# Patient Record
Sex: FEMALE | Race: BLACK OR AFRICAN AMERICAN | Employment: FULL TIME | URBAN - METROPOLITAN AREA
[De-identification: names, ages, dates, MRNs, and addresses within clinical notes are randomized per-mention and may not be internally consistent; named-entity substitution may affect disease eponyms.]

---

## 2020-06-14 ENCOUNTER — HOSPITAL ENCOUNTER (EMERGENCY)
Age: 30
Discharge: HOME | End: 2020-06-14
Attending: EMERGENCY MEDICINE
Payer: COMMERCIAL

## 2020-06-14 ENCOUNTER — APPOINTMENT (OUTPATIENT)
Dept: ULTRASOUND IMAGING | Age: 30
End: 2020-06-14
Attending: EMERGENCY MEDICINE
Payer: COMMERCIAL

## 2020-06-14 VITALS
HEART RATE: 76 BPM | SYSTOLIC BLOOD PRESSURE: 109 MMHG | HEIGHT: 62 IN | DIASTOLIC BLOOD PRESSURE: 76 MMHG | BODY MASS INDEX: 32.76 KG/M2 | RESPIRATION RATE: 16 BRPM | WEIGHT: 178 LBS | TEMPERATURE: 98.3 F | OXYGEN SATURATION: 100 %

## 2020-06-14 DIAGNOSIS — N30.00 ACUTE CYSTITIS WITHOUT HEMATURIA: ICD-10-CM

## 2020-06-14 DIAGNOSIS — Z3A.15 15 WEEKS GESTATION OF PREGNANCY: ICD-10-CM

## 2020-06-14 DIAGNOSIS — O42.919 PRETERM PREMATURE RUPTURE OF MEMBRANES (PPROM) WITH UNKNOWN ONSET OF LABOR: Primary | ICD-10-CM

## 2020-06-14 DIAGNOSIS — O03.9 MISCARRIAGE: ICD-10-CM

## 2020-06-14 LAB
ABO + RH BLD: NORMAL
ALBUMIN SERPL-MCNC: 3.1 G/DL (ref 3.4–5)
ALBUMIN/GLOB SERPL: 0.8 {RATIO} (ref 0.8–1.7)
ALP SERPL-CCNC: 45 U/L (ref 45–117)
ALT SERPL-CCNC: 18 U/L (ref 13–56)
ANION GAP SERPL CALC-SCNC: 5 MMOL/L (ref 3–18)
APPEARANCE UR: CLEAR
AST SERPL-CCNC: 11 U/L (ref 10–38)
BACTERIA URNS QL MICRO: ABNORMAL /HPF
BASOPHILS # BLD: 0 K/UL (ref 0–0.1)
BASOPHILS NFR BLD: 0 % (ref 0–2)
BILIRUB SERPL-MCNC: 0.3 MG/DL (ref 0.2–1)
BILIRUB UR QL: NEGATIVE
BUN SERPL-MCNC: 5 MG/DL (ref 7–18)
BUN/CREAT SERPL: 8 (ref 12–20)
CALCIUM SERPL-MCNC: 8.6 MG/DL (ref 8.5–10.1)
CHLORIDE SERPL-SCNC: 108 MMOL/L (ref 100–111)
CO2 SERPL-SCNC: 24 MMOL/L (ref 21–32)
COLOR UR: YELLOW
CREAT SERPL-MCNC: 0.59 MG/DL (ref 0.6–1.3)
DIFFERENTIAL METHOD BLD: ABNORMAL
EOSINOPHIL # BLD: 0.1 K/UL (ref 0–0.4)
EOSINOPHIL NFR BLD: 2 % (ref 0–5)
EPITH CASTS URNS QL MICRO: ABNORMAL /LPF (ref 0–5)
ERYTHROCYTE [DISTWIDTH] IN BLOOD BY AUTOMATED COUNT: 12.6 % (ref 11.6–14.5)
GLOBULIN SER CALC-MCNC: 4 G/DL (ref 2–4)
GLUCOSE SERPL-MCNC: 73 MG/DL (ref 74–99)
GLUCOSE UR STRIP.AUTO-MCNC: NEGATIVE MG/DL
HCG SERPL-ACNC: ABNORMAL MIU/ML (ref 0–10)
HCT VFR BLD AUTO: 37.2 % (ref 35–45)
HGB BLD-MCNC: 12.3 G/DL (ref 12–16)
HGB UR QL STRIP: ABNORMAL
KETONES UR QL STRIP.AUTO: NEGATIVE MG/DL
KOH PREP SPEC: NORMAL
LEUKOCYTE ESTERASE UR QL STRIP.AUTO: ABNORMAL
LIPASE SERPL-CCNC: 81 U/L (ref 73–393)
LYMPHOCYTES # BLD: 1.6 K/UL (ref 0.9–3.6)
LYMPHOCYTES NFR BLD: 21 % (ref 21–52)
MCH RBC QN AUTO: 29.9 PG (ref 24–34)
MCHC RBC AUTO-ENTMCNC: 33.1 G/DL (ref 31–37)
MCV RBC AUTO: 90.5 FL (ref 74–97)
MONOCYTES # BLD: 0.5 K/UL (ref 0.05–1.2)
MONOCYTES NFR BLD: 6 % (ref 3–10)
NEUTS SEG # BLD: 5.5 K/UL (ref 1.8–8)
NEUTS SEG NFR BLD: 71 % (ref 40–73)
NITRITE UR QL STRIP.AUTO: NEGATIVE
PH UR STRIP: 7.5 [PH] (ref 5–8)
PLATELET # BLD AUTO: 175 K/UL (ref 135–420)
PMV BLD AUTO: 10.8 FL (ref 9.2–11.8)
POTASSIUM SERPL-SCNC: 3.8 MMOL/L (ref 3.5–5.5)
PROT SERPL-MCNC: 7.1 G/DL (ref 6.4–8.2)
PROT UR STRIP-MCNC: NEGATIVE MG/DL
RBC # BLD AUTO: 4.11 M/UL (ref 4.2–5.3)
RBC #/AREA URNS HPF: ABNORMAL /HPF (ref 0–5)
SERVICE CMNT-IMP: NORMAL
SERVICE CMNT-IMP: NORMAL
SODIUM SERPL-SCNC: 137 MMOL/L (ref 136–145)
SP GR UR REFRACTOMETRY: 1.02 (ref 1–1.03)
UROBILINOGEN UR QL STRIP.AUTO: 1 EU/DL (ref 0.2–1)
WBC # BLD AUTO: 7.7 K/UL (ref 4.6–13.2)
WBC URNS QL MICRO: ABNORMAL /HPF (ref 0–5)
WET PREP GENITAL: NORMAL

## 2020-06-14 PROCEDURE — 84702 CHORIONIC GONADOTROPIN TEST: CPT

## 2020-06-14 PROCEDURE — 86900 BLOOD TYPING SEROLOGIC ABO: CPT

## 2020-06-14 PROCEDURE — 85025 COMPLETE CBC W/AUTO DIFF WBC: CPT

## 2020-06-14 PROCEDURE — 83690 ASSAY OF LIPASE: CPT

## 2020-06-14 PROCEDURE — 87210 SMEAR WET MOUNT SALINE/INK: CPT

## 2020-06-14 PROCEDURE — 87491 CHLMYD TRACH DNA AMP PROBE: CPT

## 2020-06-14 PROCEDURE — 80053 COMPREHEN METABOLIC PANEL: CPT

## 2020-06-14 PROCEDURE — 81001 URINALYSIS AUTO W/SCOPE: CPT

## 2020-06-14 PROCEDURE — 99285 EMERGENCY DEPT VISIT HI MDM: CPT

## 2020-06-14 PROCEDURE — 76805 OB US >/= 14 WKS SNGL FETUS: CPT

## 2020-06-14 RX ORDER — CEPHALEXIN 500 MG/1
500 CAPSULE ORAL 2 TIMES DAILY
Qty: 14 CAP | Refills: 0 | Status: SHIPPED | OUTPATIENT
Start: 2020-06-14 | End: 2020-06-21

## 2020-06-14 NOTE — ED PROVIDER NOTES
EMERGENCY DEPARTMENT HISTORY AND PHYSICAL EXAM    Date: 6/14/2020  Patient Name: Lexie Silverio    History of Presenting Illness     Chief Complaint   Patient presents with    Threatened Miscarriage         History Provided By: Patient     History Janeth Gomez):   10:22 AM  [place] Stefan Lopez is a 26 y.o. female [id number] (4 spontaneous AB, 1 elective) at 15 weeks with a complicated obstetrical PMHX including ectopic pregnancies who presents to the emergency department C/O loss of amniotic fluid onset last night. Associated sxs include loss of fluid, resolved cramping and slight vaginal bleeding. Pt denies fevers, chills or any other sxs or complaints. Patient states she had a rush of fluid last night over about a 15-minute timeframe with some blood tingeing discharge. 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 Chief Complaint: loss of amniotic fluid  Duration: 1 day   Timing:  acute  Location: uterus  Quality: painless  Severity: Severe  Modifying Factors: Nothing makes it better or worse. Associated Symptoms: mild cramping, mild bleeding    PCP: Veronica Pennington MD    Past History     Past Medical History:  No past medical history on file. Past Surgical History:  No past surgical history on file. Family History:  No family history on file. Social History:  Social History     Tobacco Use    Smoking status: Never Smoker   Substance Use Topics    Alcohol use: Not Currently    Drug use: Never       Allergies:  No Known Allergies      Review of Systems   Review of Systems   Constitutional: Negative for chills and fever. Respiratory: Negative for shortness of breath. Cardiovascular: Negative for chest pain. Gastrointestinal: Negative for abdominal pain, nausea and vomiting. Genitourinary: Positive for pelvic pain, vaginal bleeding and vaginal discharge. Negative for dysuria. All other systems reviewed and are negative.       Physical Exam     Vitals: 06/14/20 1335 06/14/20 1336 06/14/20 1400 06/14/20 1415   BP: 110/78  106/65 109/76   Pulse:       Resp:       Temp:       SpO2:  100% 100% 100%   Weight:       Height:         Physical Exam  Vitals signs and nursing note reviewed. Vital signs and nursing notes reviewed  CONSTITUTIONAL: Alert, in no apparent distress; well-developed; well-nourished. HEAD:  Normocephalic, atraumatic  EYES: PERRL; EOM's intact. ENTM: Nose: no rhinorrhea; Throat: no erythema or exudate, mucous membranes moist  Neck:  No JVD, supple without lymphadenopathy  RESP: Chest clear, equal breath sounds. CV: S1 and S2 WNL; No murmurs, gallops or rubs. GI: Normal bowel sounds, Mild suprapubic tenderness without rebound or guarding. No masses or organomegaly. : No costo-vertebral angle tenderness. BACK:  Non-tender  UPPER EXT:  Normal inspection  LOWER EXT: No edema, no calf tenderness. Distal pulses intact. NEURO: CN intact, reflexes 2/4 and sym, strength 5/5 and sym, sensation intact. SKIN: No rashes; Normal for age and stage. PSYCH:  Alert and oriented, normal affect. Diagnostic Study Results     Labs -     Recent Results (from the past 12 hour(s))   CBC WITH AUTOMATED DIFF    Collection Time: 06/14/20 10:10 AM   Result Value Ref Range    WBC 7.7 4.6 - 13.2 K/uL    RBC 4.11 (L) 4.20 - 5.30 M/uL    HGB 12.3 12.0 - 16.0 g/dL    HCT 37.2 35.0 - 45.0 %    MCV 90.5 74.0 - 97.0 FL    MCH 29.9 24.0 - 34.0 PG    MCHC 33.1 31.0 - 37.0 g/dL    RDW 12.6 11.6 - 14.5 %    PLATELET 044 617 - 109 K/uL    MPV 10.8 9.2 - 11.8 FL    NEUTROPHILS 71 40 - 73 %    LYMPHOCYTES 21 21 - 52 %    MONOCYTES 6 3 - 10 %    EOSINOPHILS 2 0 - 5 %    BASOPHILS 0 0 - 2 %    ABS. NEUTROPHILS 5.5 1.8 - 8.0 K/UL    ABS. LYMPHOCYTES 1.6 0.9 - 3.6 K/UL    ABS. MONOCYTES 0.5 0.05 - 1.2 K/UL    ABS. EOSINOPHILS 0.1 0.0 - 0.4 K/UL    ABS.  BASOPHILS 0.0 0.0 - 0.1 K/UL    DF AUTOMATED     BLOOD TYPE, (ABO+RH)    Collection Time: 06/14/20 10:10 AM   Result Value Ref Range    ABO/Rh(D) O POSITIVE    METABOLIC PANEL, COMPREHENSIVE    Collection Time: 06/14/20 10:10 AM   Result Value Ref Range    Sodium 137 136 - 145 mmol/L    Potassium 3.8 3.5 - 5.5 mmol/L    Chloride 108 100 - 111 mmol/L    CO2 24 21 - 32 mmol/L    Anion gap 5 3.0 - 18 mmol/L    Glucose 73 (L) 74 - 99 mg/dL    BUN 5 (L) 7.0 - 18 MG/DL    Creatinine 0.59 (L) 0.6 - 1.3 MG/DL    BUN/Creatinine ratio 8 (L) 12 - 20      GFR est AA >60 >60 ml/min/1.73m2    GFR est non-AA >60 >60 ml/min/1.73m2    Calcium 8.6 8.5 - 10.1 MG/DL    Bilirubin, total 0.3 0.2 - 1.0 MG/DL    ALT (SGPT) 18 13 - 56 U/L    AST (SGOT) 11 10 - 38 U/L    Alk.  phosphatase 45 45 - 117 U/L    Protein, total 7.1 6.4 - 8.2 g/dL    Albumin 3.1 (L) 3.4 - 5.0 g/dL    Globulin 4.0 2.0 - 4.0 g/dL    A-G Ratio 0.8 0.8 - 1.7     LIPASE    Collection Time: 06/14/20 10:10 AM   Result Value Ref Range    Lipase 81 73 - 393 U/L   BETA HCG, QT    Collection Time: 06/14/20 10:10 AM   Result Value Ref Range    Beta HCG, QT 27,788 (H) 0 - 10 MIU/ML   URINALYSIS W/ RFLX MICROSCOPIC    Collection Time: 06/14/20 10:38 AM   Result Value Ref Range    Color YELLOW      Appearance CLEAR      Specific gravity 1.017 1.005 - 1.030      pH (UA) 7.5 5.0 - 8.0      Protein Negative NEG mg/dL    Glucose Negative NEG mg/dL    Ketone Negative NEG mg/dL    Bilirubin Negative NEG      Blood MODERATE (A) NEG      Urobilinogen 1.0 0.2 - 1.0 EU/dL    Nitrites Negative NEG      Leukocyte Esterase SMALL (A) NEG     KOH, OTHER SOURCES    Collection Time: 06/14/20 10:38 AM   Result Value Ref Range    Special Requests: NO SPECIAL REQUESTS      KOH NO YEAST SEEN     WET PREP    Collection Time: 06/14/20 10:38 AM   Result Value Ref Range    Special Requests: NO SPECIAL REQUESTS      Wet prep NO YEAST,TRICHOMONAS OR CLUE CELLS NOTED     URINE MICROSCOPIC ONLY    Collection Time: 06/14/20 10:38 AM   Result Value Ref Range    WBC 4 to 10 0 - 5 /hpf    RBC 4 to 10 0 - 5 /hpf    Epithelial cells FEW 0 - 5 /lpf    Bacteria FEW (A) NEG /hpf       Radiologic Studies -   US OB > 14 WKS W DOPPLER   Final Result   IMPRESSION:      Single intrauterine gestation as above.  premature rupture of membranes   with absence of amniotic fluid. CT Results  (Last 48 hours)    None        CXR Results  (Last 48 hours)    None          Medications given in the ED-  Medications - No data to display      Medical Decision Making   I am the first provider for this patient. I reviewed the vital signs, available nursing notes, past medical history, past surgical history, family history and social history. Vital Signs-Reviewed the patient's vital signs. Pulse Oximetry Analysis - 100% on RA     Cardiac Monitor:  Rate: 76 bpm  Rhythm: NSR    Records Reviewed: Nursing Notes    Provider Notes (Medical Decision Making):   DDX: PPROM, threatened miscarriage, miscarriage, fetal demise, UTI, placenta previa, placenta abruptio    Procedures:  Pelvic Exam  Date/Time: 2020 10:38 AM  Performed by: attending  Procedure duration:  10 minutes. Exam assisted by:  Cristina Holstein. Type of exam performed: bimanual and speculum. External genitalia appearance: normal.    Vaginal exam:  normal.    Cervical exam:  normal and os closed. Specimen(s) collected:  chlamydia, GC and vaginal culture. Bimanual exam:  uterine tenderness (mild). ED Course:   10:22 AM Initial assessment performed. The patients presenting problems have been discussed, and they are in agreement with the care plan formulated and outlined with them. I have encouraged them to ask questions as they arise throughout their visit. 2:05 PM Discussed with Dr. Robb Simpson, OB on call who will come to the ED to discuss options with the patient. 2:30 discussed patient's condition with Dr. Robb Simpson, OB.   As the infant still has fetal cardiac activity, induction cannot be performed at [facility].  As the patient has established care with [facility] obstetrics, patient was offered the option to either be transferred to [facility], or to go home today with close follow-up in her OBs office. She was also offered admission for observation at [facility] if she preferred. The patient stated that she would prefer to go home and be with her  and that she would follow-up with her OB/GYN tomorrow. I offered to talk with her OB/GYN on call so that they were completely aware of the situation. 2:52 PM Discussed with Dr. Ana Bell at [facility] and they will follow as an outpatient and return to [facility] labor if in labor or febrile. Diagnosis and Disposition     Discussion:  26 y.o. female presenting to the ED with a rush of fluid last night which is consistent with  premature rupture of membranes. Ultrasound in the ED confirmed this. OB/GYN was consulted to help  the patient on her options. After discussion of the options, patient elected home care and follow-up as an outpatient with her OB/GYN. I discussed the case with her OB/GYN on-call provider so that they were aware of the situation. Patient will be discharged home and will also receive treatment for an incidental UTI which was found today. Patient understands and agrees with this plan. DISCHARGE NOTE:  3:06 PM   [facility]'s  results have been reviewed with her. She has been counseled regarding her diagnosis, treatment, and plan. She verbally conveys understanding and agreement of the signs, symptoms, diagnosis, treatment and prognosis and additionally agrees to follow up as discussed. She also agrees with the care-plan and conveys that all of her questions have been answered. I have also provided discharge instructions for her that include: educational information regarding their diagnosis and treatment, and list of reasons why they would want to return to the ED prior to their follow-up appointment, should her condition change.  She has been provided with education for proper emergency department utilization. CLINICAL IMPRESSION:    1.  premature rupture of membranes (PPROM) with unknown onset of labor    2. Acute cystitis without hematuria    3. Miscarriage    4. 15 weeks gestation of pregnancy        PLAN:  1. D/C Home  2. Current Discharge Medication List      START taking these medications    Details   cephALEXin (Keflex) 500 mg capsule Take 1 Cap by mouth two (2) times a day for 7 days. Qty: 14 Cap, Refills: 0           3. Follow-up Information     Follow up With Specialties Details Why Stanley Najera MD Obstetrics & Gynecology Schedule an appointment as soon as possible for a visit in 1 day For follow up from Emergency Department visit. [address]      [facility] EMERGENCY DEPT Emergency Medicine  As needed; If symptoms worsen [address] Rajiv Mcekon  [address] [id number] [address]     Please note that this dictation was completed with Guide, the computer voice recognition software. Quite often unanticipated grammatical, syntax, homophones, and other interpretive errors are inadvertently transcribed by the computer software. Please disregard these errors. Please excuse any errors that have escaped final proofreading.     Frances Oconnell MD

## 2020-06-14 NOTE — DISCHARGE INSTRUCTIONS
Patient Education        Learning About Prelabor Rupture of Membranes (PROM)  What is prelabor rupture of membranes? Before a baby is born, the amniotic sac breaks open. This causes amniotic fluid to either leak slowly or gush out. It's often called \"having your water break. \" When this happens before contractions start, it is called prelabor rupture of membranes (PROM). PROM can occur at any time during pregnancy before labor begins. Early PROM can happen before 37 full weeks of pregnancy. Then it's called  prelabor rupture of membranes, or pPROM. Smoking while pregnant increases the risk of PROM. What happens when you have PROM? · Labor usually starts soon after PROM. If it doesn't, your doctor may induce labor (use medicine to start it). · The amniotic sac protects the baby from infection. After the sac is torn, the risk for infection is much higher. If you think your sac has broken open, avoid letting anything enter your vagina. Don't have sex or flush your vagina with fluid (douche). · After the sac ruptures, the baby moves down into the pelvis. The baby may press on the umbilical cord. This is not common, but it can cut off the baby's oxygen and blood supplies. In that case the baby must be delivered quickly. What are the symptoms? · When your water breaks, it often feels like a large gush of water. Or it may feel like you're leaking a small amount of water. · Water from the amniotic sac is normally a cloudy-white to an cayden-straw color. If you notice that your water is dark or greenish, foul-smelling, or bloody, tell your doctor. How is PROM treated? Your doctor will probably have you go to the hospital. If labor doesn't start in 12 to 24 hours, your doctor may want to induce. If your doctor is worried about infection, you may be given antibiotics. Follow-up care is a key part of your treatment and safety.  Be sure to make and go to all appointments, and call your doctor if you are having problems. It's also a good idea to know your test results and keep a list of the medicines you take. Where can you learn more? Go to http://shane-hector.info/  Enter Q995 in the search box to learn more about \"Learning About Prelabor Rupture of Membranes (PROM). \"  Current as of: February 11, 2020               Content Version: 12.5  © 7021-1354 [employer]. Care instructions adapted under license by [employer] (which disclaims liability or warranty for this information). If you have questions about a medical condition or this instruction, always ask your healthcare professional. [address] any warranty or liability for your use of this information.

## 2020-06-14 NOTE — ED NOTES
I have reviewed discharge instructions with the patient. The patient verbalized understanding. VSS. NAD. Ambulatory to triage. Arm band placed in shred it.

## 2020-06-14 NOTE — CONSULTS
History & Physical    Name: Chung Reynolds MRN: [id number]  SSN: [id number]    YOB: 1990  Age: 26 y.o. Sex: female      Subjective:     Reason for Admission:  Pregnancy and PPROM    History of Present Illness: Ms. Juliet العلي is a 26 y.o.  female with an estimated gestational age of 17w0d with Estimated Date of Delivery: 20. Patient complains of new onset vaginal leaking of fluid for 1 days. Pregnancy has been complicated by no issues. Patient denies vaginal bleeding . Mild abd cramping . Pt has a long history of multiple SAB. One full term pregnancy 14 yrs ago. OB History    Para Term  AB Living   7 1     1 1   SAB TAB Ectopic Molar Multiple Live Births                    # Outcome Date GA Lbr Willem/2nd Weight Sex Delivery Anes PTL Lv   7 Current            6             5             4             3             2 AB            1 Para              No past medical history on file. No past surgical history on file. Social History     Occupational History    Not on file   Tobacco Use    Smoking status: Never Smoker   Substance and Sexual Activity    Alcohol use: Not Currently    Drug use: Never    Sexual activity: Not on file      No family history on file. No Known Allergies  Prior to Admission medications    Not on File        Review of Systems:  A comprehensive review of systems was negative except for that written in the History of Present Illness. Objective:     Vitals:    Vitals:    20 1335 20 1336 20 1400 20 1415   BP: 110/78  106/65 109/76   Pulse:       Resp:       Temp:       SpO2:  100% 100% 100%   Weight:       Height:          Temp (24hrs), Av.3 °F (36.8 °C), Min:98.3 °F (36.8 °C), Max:98.3 °F (36.8 °C)    BP  Min: 106/65  Max: 116/59     Physical Exam:  Patient without distress. Heart: Regular rate and rhythm  Lung: normal respiratory effort     Membranes:  ruptured. no bleeding per ER DOC  Cervix:  Closed per ER MD exam  Uterine Activity:  None  Fetal Heart Rate:  174       Lab/Data Review:  Recent Results (from the past 24 hour(s))   CBC WITH AUTOMATED DIFF    Collection Time: 06/14/20 10:10 AM   Result Value Ref Range    WBC 7.7 4.6 - 13.2 K/uL    RBC 4.11 (L) 4.20 - 5.30 M/uL    HGB 12.3 12.0 - 16.0 g/dL    HCT 37.2 35.0 - 45.0 %    MCV 90.5 74.0 - 97.0 FL    MCH 29.9 24.0 - 34.0 PG    MCHC 33.1 31.0 - 37.0 g/dL    RDW 12.6 11.6 - 14.5 %    PLATELET 163 552 - 293 K/uL    MPV 10.8 9.2 - 11.8 FL    NEUTROPHILS 71 40 - 73 %    LYMPHOCYTES 21 21 - 52 %    MONOCYTES 6 3 - 10 %    EOSINOPHILS 2 0 - 5 %    BASOPHILS 0 0 - 2 %    ABS. NEUTROPHILS 5.5 1.8 - 8.0 K/UL    ABS. LYMPHOCYTES 1.6 0.9 - 3.6 K/UL    ABS. MONOCYTES 0.5 0.05 - 1.2 K/UL    ABS. EOSINOPHILS 0.1 0.0 - 0.4 K/UL    ABS. BASOPHILS 0.0 0.0 - 0.1 K/UL    DF AUTOMATED     BLOOD TYPE, (ABO+RH)    Collection Time: 06/14/20 10:10 AM   Result Value Ref Range    ABO/Rh(D) O POSITIVE    METABOLIC PANEL, COMPREHENSIVE    Collection Time: 06/14/20 10:10 AM   Result Value Ref Range    Sodium 137 136 - 145 mmol/L    Potassium 3.8 3.5 - 5.5 mmol/L    Chloride 108 100 - 111 mmol/L    CO2 24 21 - 32 mmol/L    Anion gap 5 3.0 - 18 mmol/L    Glucose 73 (L) 74 - 99 mg/dL    BUN 5 (L) 7.0 - 18 MG/DL    Creatinine 0.59 (L) 0.6 - 1.3 MG/DL    BUN/Creatinine ratio 8 (L) 12 - 20      GFR est AA >60 >60 ml/min/1.73m2    GFR est non-AA >60 >60 ml/min/1.73m2    Calcium 8.6 8.5 - 10.1 MG/DL    Bilirubin, total 0.3 0.2 - 1.0 MG/DL    ALT (SGPT) 18 13 - 56 U/L    AST (SGOT) 11 10 - 38 U/L    Alk.  phosphatase 45 45 - 117 U/L    Protein, total 7.1 6.4 - 8.2 g/dL    Albumin 3.1 (L) 3.4 - 5.0 g/dL    Globulin 4.0 2.0 - 4.0 g/dL    A-G Ratio 0.8 0.8 - 1.7     LIPASE    Collection Time: 06/14/20 10:10 AM   Result Value Ref Range    Lipase 81 73 - 393 U/L   BETA HCG, QT    Collection Time: 06/14/20 10:10 AM   Result Value Ref Range    Beta HCG, QT 58,220 (H) 0 - 10 MIU/ML   URINALYSIS W/ RFLX MICROSCOPIC    Collection Time: 20 10:38 AM   Result Value Ref Range    Color YELLOW      Appearance CLEAR      Specific gravity 1.017 1.005 - 1.030      pH (UA) 7.5 5.0 - 8.0      Protein Negative NEG mg/dL    Glucose Negative NEG mg/dL    Ketone Negative NEG mg/dL    Bilirubin Negative NEG      Blood MODERATE (A) NEG      Urobilinogen 1.0 0.2 - 1.0 EU/dL    Nitrites Negative NEG      Leukocyte Esterase SMALL (A) NEG     KOH, OTHER SOURCES    Collection Time: 20 10:38 AM   Result Value Ref Range    Special Requests: NO SPECIAL REQUESTS      KOH NO YEAST SEEN     WET PREP    Collection Time: 20 10:38 AM   Result Value Ref Range    Special Requests: NO SPECIAL REQUESTS      Wet prep NO YEAST,TRICHOMONAS OR CLUE CELLS NOTED     URINE MICROSCOPIC ONLY    Collection Time: 20 10:38 AM   Result Value Ref Range    WBC 4 to 10 0 - 5 /hpf    RBC 4 to 10 0 - 5 /hpf    Epithelial cells FEW 0 - 5 /lpf    Bacteria FEW (A) NEG /hpf       ULTRASOUND:  Uterus measures 12.7 0.8 x 8.8 cm. Intrauterine gestation with biometric  parameters for gestational age of 12 weeks 6 days. Fetal heart rate is 174 bpm.  Placenta location is posterior and fundal. No amniotic fluid seen. Cervical  length measures 1.3 cm.     Ovaries are normal size and normal vascularity on Doppler evaluation. No adnexal  mass. Left corpus luteal cyst measuring 1.2 cm. Assessment and Plan:     Previable  premature rupture of membranes:    Viable IUP at 15 weeks grossly ruptured and no amniotic fluid noted on ultrasound. Pt is stable, no evidence of infection, and otherwise without complaints. She is a patient of [facility] ob[facility]gyn group. Discussed treatment options. IOL is not an option here given FHR. Pt desires to go home and discuss with her . Dr. Carlos Farrar will notify her Ob and patient will follow up. Bleeding and infection precautions given.     Blease Goldberg, MD

## 2020-06-14 NOTE — ED TRIAGE NOTES
Patient presents from home c/o of lower abd pain. Patient is 15 weeks pregnant. Patient reported a gush of clear liquid last night that lasted 10-15 minutes. Lower abd pain started in the middle of the night.  [id number]

## 2020-06-14 NOTE — ED NOTES
Hourly rounding on patient. Patient resting comfortably. NAD. Updated on plan of care. Bed low and locked. Call bell within reach.

## 2020-06-14 NOTE — ED NOTES
Telephone call from Alejandrina Petersen from ultrasound she is in route to the hospital. unfortunately there is a 5 mile back [place] the [place] and she is delayed.  Patient made aware of this

## 2020-06-17 LAB
C TRACH RRNA SPEC QL NAA+PROBE: NEGATIVE
N GONORRHOEA RRNA SPEC QL NAA+PROBE: NEGATIVE
SPECIMEN SOURCE: NORMAL